# Patient Record
Sex: FEMALE | Race: BLACK OR AFRICAN AMERICAN | Employment: FULL TIME | URBAN - METROPOLITAN AREA
[De-identification: names, ages, dates, MRNs, and addresses within clinical notes are randomized per-mention and may not be internally consistent; named-entity substitution may affect disease eponyms.]

---

## 2017-08-14 ENCOUNTER — OFFICE VISIT (OUTPATIENT)
Dept: INTERNAL MEDICINE CLINIC | Age: 27
End: 2017-08-14

## 2017-08-14 VITALS
OXYGEN SATURATION: 97 % | HEART RATE: 62 BPM | DIASTOLIC BLOOD PRESSURE: 70 MMHG | HEIGHT: 63 IN | BODY MASS INDEX: 21.19 KG/M2 | RESPIRATION RATE: 16 BRPM | WEIGHT: 119.6 LBS | SYSTOLIC BLOOD PRESSURE: 108 MMHG | TEMPERATURE: 98.2 F

## 2017-08-14 DIAGNOSIS — Z23 ENCOUNTER FOR IMMUNIZATION: ICD-10-CM

## 2017-08-14 DIAGNOSIS — G35 MS (MULTIPLE SCLEROSIS) (HCC): ICD-10-CM

## 2017-08-14 DIAGNOSIS — Z00.00 PHYSICAL EXAM, ANNUAL: Primary | ICD-10-CM

## 2017-08-14 RX ORDER — GLATIRAMER 40 MG/ML
40 INJECTION, SOLUTION SUBCUTANEOUS
COMMUNITY

## 2017-08-14 NOTE — PROGRESS NOTES
HISTORY OF PRESENT ILLNESS  Gaston Meek is a 32 y.o. female. HPI   Pt is here to establish care. BP today is great-108/70    Pt follows with Dr. Farhad Webber (neuro) at [facility] for her MS   She was dx'd 7/2014 with multiple sclerosis  Continues copaxone per this physician  She sees him routinely, will get notes for review  She states Dr. Farhad Webber would stop copaxone if she were to become pregnant     Reviewed labs 2012 per [facility]  Lupus test-nl, sed rate nl, RICKY positive, kidney nl, liver nl, blood counts nl  Ordered basic labs to complete today    Wt is 119 lbs-normal ranges  She is not exercising routinely  Discussed diet with her     Pt follows at [facility] (gyn)  She sees various providers here  Last here around 4/17, will get notes  She is not actively trying to pregnant at this time but is open to this  She wonders about issues with infertility and testing with this-addressed  Pt had been trying for about 6 months but had trouble with this and did not ultimately get pregnant  Discussed actively trying for about 6 months and then seeing gyn for further evaluation  Advised starting prenatal vitamin at this time and avoiding alcohol while trying to conceive      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   Discussed hep B series- will check titres for this and varicella  Discussed completing tdap today and flu shot this fall   Advised obtaining her immunizations records      PMHx:  Multiple sclerosis       FMHx:  Maternal side- diabetes  Grandfather- lung cancer  Grandmother- HTN, diabetes      PSHx:  None     SHx:  Never smoker  Rare alcohol  Not   No children   Going to nursing school         PREVENTIVE:  Colonoscopy, mammogram, DEXA: not yet needed  Pap: [facility], 4/17  Tdap: given today, 8/14/2017  Flu shot: will get this year for nursing school   Eye exam: [facility], 1/17  Lipids: 8/17 ordered      There are no active problems to display for this patient.     Current Outpatient Prescriptions   Medication Sig Dispense Refill    glatiramer (COPAXONE) 40 mg/mL injection 40 mg by SubCUTAneous route daily.  Norethindrn A-E Estradiol-Iron (LOESTRIN 24 FE) 1-20 (24)-75(4) mg-mcg-mg tablet Take  by mouth daily.  methylPREDNISolone (MEDROL DOSEPACK) 4 mg tablet Per dose pack instructions 1 Package 0    promethazine (PHENERGAN) 25 mg tablet Take 1 Tab by mouth every six (6) hours as needed for Nausea. 12 Tab 0    cephALEXin (KEFLEX) 500 mg capsule Take 1 Cap by mouth three (3) times daily. 15 Cap 0    MECLIZINE HCL (ANTIVERT PO) Take  by mouth. History reviewed. No pertinent surgical history. Lab Results  Component Value Date/Time   WBC 9.1 03/02/2012 06:50 PM   HGB 13.2 03/02/2012 06:50 PM   HCT 38.3 03/02/2012 06:50 PM   PLATELET 379 27/83/9607 06:50 PM   MCV 96.5 03/02/2012 06:50 PM     No results found for: CHOL, CHOLPOCT, HDL, LDL, LDLC, LDLCPOC, LDLCEXT, TRIGL, TGLPOCT, CHHD, CHHDX  Lab Results  Component Value Date/Time   GFR est non-AA >60 03/02/2012 06:50 PM   GFR est AA >60 03/02/2012 06:50 PM   Creatinine 0.9 03/02/2012 06:50 PM   BUN 15 03/02/2012 06:50 PM   Sodium 138 03/02/2012 06:50 PM   Potassium 4.3 03/02/2012 06:50 PM   Chloride 102 03/02/2012 06:50 PM   CO2 29 03/02/2012 06:50 PM          Review of Systems   Constitutional: Negative for chills and fever. HENT: Negative for hearing loss and tinnitus. Eyes: Negative for blurred vision and double vision. Respiratory: Negative for shortness of breath and wheezing. Cardiovascular: Negative for chest pain and palpitations. Gastrointestinal: Negative for nausea and vomiting. Genitourinary: Negative for dysuria and frequency. Musculoskeletal: Negative for back pain and falls. Skin: Negative for itching and rash. Neurological: Negative for dizziness, loss of consciousness and headaches. Psychiatric/Behavioral: Negative for depression. The patient is not nervous/anxious.         Physical Exam Constitutional: She is oriented to person, place, and time. She appears well-developed and well-nourished. No distress. HENT:   Head: Normocephalic and atraumatic. Right Ear: External ear normal.   Left Ear: External ear normal.   Mouth/Throat: Oropharynx is clear and moist. No oropharyngeal exudate. Eyes: Conjunctivae and EOM are normal. Pupils are equal, round, and reactive to light. Right eye exhibits no discharge. Left eye exhibits no discharge. No scleral icterus. Neck: Normal range of motion. Neck supple. No carotid bruits     Cardiovascular: Normal rate, regular rhythm, normal heart sounds and intact distal pulses. Exam reveals no gallop and no friction rub. No murmur heard. Pulmonary/Chest: Effort normal and breath sounds normal. No respiratory distress. She has no wheezes. She has no rales. She exhibits no tenderness. Abdominal: Soft. She exhibits no distension and no mass. There is no tenderness. There is no rebound and no guarding. Musculoskeletal: Normal range of motion. She exhibits no edema, tenderness or deformity. Lymphadenopathy:     She has no cervical adenopathy. Neurological: She is alert and oriented to person, place, and time. Coordination normal.   Skin: Skin is warm and dry. No rash noted. She is not diaphoretic. No erythema. No pallor. Psychiatric: She has a normal mood and affect. Her behavior is normal.       ASSESSMENT and PLAN    ICD-10-CM ICD-9-CM    1. Physical exam, annual    Normal wt, discussed diet and exercise. Due for labs, ordered. Eye exam UTD. Pelvic exam UTD. Flu shot in the fall.  tdap given today T91.12 W73.8 METABOLIC PANEL, COMPREHENSIVE      LIPID PANEL      CBC W/O DIFF      TSH 3RD GENERATION      VZV AB, IGG      HEP B SURFACE AB      MEASLES/MUMPS/RUBELLA IMMUNITY   2. MS (multiple sclerosis) ([facility] [address]    Follows with Dr. Hulon Mortimer, stable on copaxone G35 340           Depression screen reviewed and negative      Written by Michele Tomlinson, [address] [place] [address], as dictated by Marge Galvan MD.    Current diagnosis and concerns discussed with pt at length. Understands risks and benefits or current treatment plan and medications and accepts the treatment and medication with any possible risks.   Pt asks appropriate questions which were answered.   Pt instructed to call with any concerns or problems. This note will not be viewable in [address]

## 2017-08-14 NOTE — PROGRESS NOTES
VORB per Dr. Vida West, TDAP vaccine given into L deltoid muscle at 0.5ml after obtaining consent from the pt. Administered by Chucho Corado LPN. VIS given. 8/14/17.

## 2017-08-14 NOTE — MR AVS SNAPSHOT
Visit Information Date & Time Provider Department Dept. Phone Encounter #  
 8/14/2017  9:30 AM Jerrica Lizarraga, [facility] [phone] [id number] Follow-up Instructions Return in about 1 year (around 8/14/2018). Upcoming Health Maintenance Date Due DTaP/Tdap/Td series (1 - Tdap) 4/15/2011 INFLUENZA AGE 9 TO ADULT 8/1/2017 PAP AKA CERVICAL CYTOLOGY 4/10/2020 Allergies as of 8/14/2017  Review Complete On: 8/14/2017 By: Jerrica Lizarraga MD  
 No Known Allergies Current Immunizations  Reviewed on 8/14/2017 No immunizations on file. Reviewed by Jerrica Lizarraga MD on 8/14/2017 at  9:57 AM  
You Were Diagnosed With   
  
 Codes Comments Physical exam, annual    -  Primary ICD-10-CM: Z00.00 ICD-9-CM: V70.0 MS (multiple sclerosis) ([facility]     ICD-10-CM: G35 
ICD-9-CM: 487 Vitals BP Pulse Temp Resp Height(growth percentile) Weight(growth percentile) 108/70 (BP 1 Location: Left arm, BP Patient Position: Sitting) 62 98.2 °F (36.8 °C) (Oral) 16 5' 2.5\" (1.588 m) 119 lb 9.6 oz (54.3 kg) SpO2 BMI Smoking Status 97% 21.53 kg/m2 Never Smoker Vitals History BMI and BSA Data Body Mass Index Body Surface Area  
 21.53 kg/m 2 1.55 m 2 Preferred Pharmacy Pharmacy Name Phone [facility]/PHARMACY #[id number] AT [address] [place] [address] [phone] Your Updated Medication List  
  
   
This list is accurate as of: 8/14/17 10:17 AM.  Always use your most recent med list.  
  
  
  
  
 COPAXONE 40 mg/mL injection Generic drug:  glatiramer 40 mg by SubCUTAneous route daily. We Performed the Following CBC W/O DIFF [15294 CPT(R)] HEP B SURFACE AB [id number] CPT(R)] LIPID PANEL [54940 CPT(R)] MEASLES/MUMPS/RUBELLA IMMUNITY [id number] CPT(R)] METABOLIC PANEL, COMPREHENSIVE [56478 CPT(R)] TSH 3RD GENERATION [90405 CPT(R)] VZV AB, IGG [id number] CPT(R)] Follow-up Instructions Return in about 1 year (around 8/14/2018). Introducing [facility] & HEALTH SERVICES! Rob Espino introduces [employer] patient portal. Now you can access parts of your medical record, email your doctor's office, and request medication refills online. 1. In your internet browser, go to [email or web address] 2. Click on the First Time User? Click Here link in the Sign In box. You will see the New Member Sign Up page. 3. Enter your Ag[employer] Access Code exactly as it appears below. You will not need to use this code after youve completed the sign-up process. If you do not sign up before the expiration date, you must request a new code. · [employer] Access Code: [email or web address] Expires: 11/12/2017 10:08 AM 
 
4. Enter the last four digits of your Social Security Number (xxxx) and Date of Birth (mm/dd/yyyy) as indicated and click Submit. You will be taken to the next sign-up page. 5. Create a Agavideo ID. This will be your [employer] login ID and cannot be changed, so think of one that is secure and easy to remember. 6. Create a Agav[employer] password. You can change your password at any time. 7. Enter your Password Reset Question and Answer. This can be used at a later time if you forget your password. 8. Enter your e-mail address. You will receive e-mail notification when new information is available in [address] 9. Click Sign Up. You can now view and download portions of your medical record. 10. Click the Download Summary menu link to download a portable copy of your medical information. If you have questions, please visit the Frequently Asked Questions section of the [employer] website. Remember, [employer] is NOT to be used for urgent needs. For medical emergencies, dial 911. Now available from your iPhone and Android! Please provide this summary of care documentation to your next provider. Your primary care clinician is listed as Xiomy Sampson. If you have any questions after today's visit, please call [phone].

## 2017-08-15 LAB
ALBUMIN SERPL-MCNC: 4.9 G/DL (ref 3.5–5.5)
ALBUMIN/GLOB SERPL: 1.6 {RATIO} (ref 1.2–2.2)
ALP SERPL-CCNC: 53 IU/L (ref 39–117)
ALT SERPL-CCNC: 7 IU/L (ref 0–32)
AST SERPL-CCNC: 14 IU/L (ref 0–40)
BILIRUB SERPL-MCNC: 0.3 MG/DL (ref 0–1.2)
BUN SERPL-MCNC: 14 MG/DL (ref 6–20)
BUN/CREAT SERPL: 14 (ref 9–23)
CALCIUM SERPL-MCNC: 9.3 MG/DL (ref 8.7–10.2)
CHLORIDE SERPL-SCNC: 102 MMOL/L (ref 96–106)
CHOLEST SERPL-MCNC: 159 MG/DL (ref 100–199)
CO2 SERPL-SCNC: 21 MMOL/L (ref 18–29)
CREAT SERPL-MCNC: 0.99 MG/DL (ref 0.57–1)
ERYTHROCYTE [DISTWIDTH] IN BLOOD BY AUTOMATED COUNT: 15.7 % (ref 12.3–15.4)
GLOBULIN SER CALC-MCNC: 3 G/DL (ref 1.5–4.5)
GLUCOSE SERPL-MCNC: 76 MG/DL (ref 65–99)
HBV SURFACE AB SER QL: REACTIVE
HCT VFR BLD AUTO: 27.4 % (ref 34–46.6)
HDLC SERPL-MCNC: 57 MG/DL
HGB BLD-MCNC: 8.3 G/DL (ref 11.1–15.9)
LDLC SERPL CALC-MCNC: 91 MG/DL (ref 0–99)
MCH RBC QN AUTO: 23.8 PG (ref 26.6–33)
MCHC RBC AUTO-ENTMCNC: 30.3 G/DL (ref 31.5–35.7)
MCV RBC AUTO: 79 FL (ref 79–97)
MEV IGG SER IA-ACNC: >300 AU/ML
MUV IGG SER IA-ACNC: 245 AU/ML
PLATELET # BLD AUTO: 344 X10E3/UL (ref 150–379)
POTASSIUM SERPL-SCNC: 4.7 MMOL/L (ref 3.5–5.2)
PROT SERPL-MCNC: 7.9 G/DL (ref 6–8.5)
RBC # BLD AUTO: 3.49 X10E6/UL (ref 3.77–5.28)
RUBV IGG SERPL IA-ACNC: 7.42 INDEX
SODIUM SERPL-SCNC: 138 MMOL/L (ref 134–144)
TRIGL SERPL-MCNC: 55 MG/DL (ref 0–149)
TSH SERPL DL<=0.005 MIU/L-ACNC: 1.13 UIU/ML (ref 0.45–4.5)
VLDLC SERPL CALC-MCNC: 11 MG/DL (ref 5–40)
VZV IGG SER IA-ACNC: >4000 INDEX
WBC # BLD AUTO: 6 X10E3/UL (ref 3.4–10.8)

## 2017-08-15 NOTE — PROGRESS NOTES
Let her know labs normal and titers available for school paperwork, once we get flu shot in have her get flu shot and we can sign papers    She needs to bring in records of polio vaccination     On labs has new anemia, does she have heavy menstrual cycles, repeat cbc, ferritin, iron in 2 weeks for further eval, start daily iron 325mg, may need GI w/u if no heavy periods.

## 2017-08-16 DIAGNOSIS — D50.9 IRON DEFICIENCY ANEMIA, UNSPECIFIED IRON DEFICIENCY ANEMIA TYPE: Primary | ICD-10-CM

## 2017-08-16 NOTE — PROGRESS NOTES
Called and spoke with patient after verifying name and . Notified patient of lab results and recommendations from provider. Patient was given a chance to ask questions and verbalized an understanding of the information. Labs ordered, patient states she will come in in 2 weeks for repeat labs. Patient states she does have heavy menstrual cycles.

## 2017-09-01 ENCOUNTER — CLINICAL SUPPORT (OUTPATIENT)
Dept: INTERNAL MEDICINE CLINIC | Age: 27
End: 2017-09-01

## 2017-09-01 DIAGNOSIS — Z23 ENCOUNTER FOR IMMUNIZATION: Primary | ICD-10-CM

## 2017-09-01 NOTE — PROGRESS NOTES
JOEL per Dr. Daily Hopkins, flu vaccine given after obtaining the consent form. 0.5ml injected in the L deltoid muscle intramuscularly. Administered by Janet Suh LPN. VIS given. 9/1/17. Pt informed to come back 9/5/17 to get completed school immunization form. Pt verbalized understanding of information discussed w/ no further questions at this time.

## 2018-08-14 ENCOUNTER — OFFICE VISIT (OUTPATIENT)
Dept: INTERNAL MEDICINE CLINIC | Age: 28
End: 2018-08-14

## 2018-08-14 VITALS
DIASTOLIC BLOOD PRESSURE: 66 MMHG | RESPIRATION RATE: 16 BRPM | OXYGEN SATURATION: 98 % | TEMPERATURE: 98.1 F | HEART RATE: 86 BPM | SYSTOLIC BLOOD PRESSURE: 103 MMHG | HEIGHT: 63 IN | BODY MASS INDEX: 21.97 KG/M2 | WEIGHT: 124 LBS

## 2018-08-14 DIAGNOSIS — D64.9 ANEMIA, UNSPECIFIED TYPE: ICD-10-CM

## 2018-08-14 DIAGNOSIS — Z00.00 PHYSICAL EXAM, ANNUAL: Primary | ICD-10-CM

## 2018-08-14 NOTE — PROGRESS NOTES
HISTORY OF PRESENT ILLNESS  Maria Luisa Martínez is a 29 y.o. female. HPI  Last here 8/14/17. Pt is here for routine care.     BP today is 103/66     Pt follows with Dr. Elicia Zamora (neuro) at [facility] for her [address]   She was dx'd 7/2014 with multiple sclerosis  Continues copaxone per this physician  Reviewed notes 5/18: blurry vision, continue copaxone, nl BP     Reviewed labs 8/17  Will get labs today     Wt is up 5 lbs x lov  Her wt is within nl ranges    On last labs, pt was anemic  Pt has h/o heavy periods  Pt has tried to take her iron regularly but states that it upsets her stomach     Pt follows at [address] [place] [address] (gyn)  She sees various providers here  Last here around 4/17, will get notes  She is not actively trying to pregnant at this time but is open to this    Pt presented with form to fill out for school     PREVENTIVE:  Colonoscopy, mammogram, DEXA: not yet needed  Pap: [address] [place] [address], 4/18  Tdap: 8/14/2017  Flu shot: Fall 2017  Eye exam: [facility], 1/17   Lipids: 8/17 91    Patient Active Problem List    Diagnosis Date Noted    MS (multiple sclerosis) ([facility] [address] 08/14/2017     Current Outpatient Prescriptions   Medication Sig Dispense Refill    glatiramer (COPAXONE) 40 mg/mL injection 40 mg by SubCUTAneous route every Monday, Wednesday, Friday. History reviewed. No pertinent surgical history.    Lab Results  Component Value Date/Time   WBC 6.0 08/14/2017 10:24 AM   HGB 8.3 (L) 08/14/2017 10:24 AM   HCT 27.4 (L) 08/14/2017 10:24 AM   PLATELET 025 79/62/9073 10:24 AM   MCV 79 08/14/2017 10:24 AM     Lab Results  Component Value Date/Time   Cholesterol, total 159 08/14/2017 10:24 AM   HDL Cholesterol 57 08/14/2017 10:24 AM   LDL, calculated 91 08/14/2017 10:24 AM   Triglyceride 55 08/14/2017 10:24 AM     Lab Results  Component Value Date/Time   GFR est non-AA 78 08/14/2017 10:24 AM   GFR est AA 90 08/14/2017 10:24 AM   Creatinine 0.99 08/14/2017 10:24 AM   BUN 14 08/14/2017 10:24 AM   Sodium 138 08/14/2017 10:24 AM   Potassium 4.7 08/14/2017 10:24 AM   Chloride 102 08/14/2017 10:24 AM   CO2 21 08/14/2017 10:24 AM        Review of Systems   Respiratory: Negative for shortness of breath. Cardiovascular: Negative for chest pain. Physical Exam   Constitutional: She is oriented to person, place, and time. She appears well-developed and well-nourished. No distress. HENT:   Head: Normocephalic and atraumatic. Right Ear: External ear normal.   Left Ear: External ear normal.   Mouth/Throat: Oropharynx is clear and moist. No oropharyngeal exudate. Eyes: Conjunctivae and EOM are normal. Right eye exhibits no discharge. Left eye exhibits no discharge. Neck: Normal range of motion. Neck supple. No carotid bruits   Cardiovascular: Normal rate, regular rhythm, normal heart sounds and intact distal pulses. Exam reveals no gallop and no friction rub. No murmur heard. Pulmonary/Chest: Effort normal and breath sounds normal. No respiratory distress. She has no wheezes. She has no rales. She exhibits no tenderness. Abdominal: Soft. She exhibits no distension and no mass. There is no tenderness. There is no rebound and no guarding. Musculoskeletal: Normal range of motion. She exhibits no edema, tenderness or deformity. Lymphadenopathy:     She has no cervical adenopathy. Neurological: She is alert and oriented to person, place, and time. Coordination normal.   Skin: Skin is warm and dry. No rash noted. She is not diaphoretic. No erythema. No pallor. Psychiatric: She has a normal mood and affect. Her behavior is normal.       ASSESSMENT and PLAN    ICD-10-CM ICD-9-CM    1. Physical exam, annual    Nl wt, nl BP, labs ordered, flu shot in the fall, pelvic UTD, eye exam this winter, completed paperwork for her   Z00.00 V70.0 LIPID PANEL      TSH 3RD GENERATION      HEMOGLOBIN A1C WITH EAG      CBC W/O DIFF      METABOLIC PANEL, COMPREHENSIVE   2.  Anemia, unspecified type    Pt with sig anemia on recent labs, not taking iron consistently, she did not complete lab workup, ordered this today, she does have heavy menstrual cycles which are possible cause, denies fmhx of thalassemia or sickle cell   D64.9 285.9 FERRITIN      IRON PROFILE      VITAMIN B12      CANCELED: ALPHA THALASSEMIA      Depression screen reviewed and negative. Scribed by Aaron Monson of [address] [place] [address] [address], as dictated by Dr. Andrei Vieira. Current diagnosis and concerns discussed with pt at length. Pt understands risks and benefits or current treatment plan and medications, and accepts the treatment and medication with any possible risks. Pt asks appropriate questions, which were answered. Pt was instructed to call with any concerns or problems. This note will not be viewable in [address]

## 2018-08-14 NOTE — MR AVS SNAPSHOT
[address] [place] [phone] Patient: Kurt Figueroa MRN: [id number] KQV:[id number] Visit Information Date & Time Provider Department Dept. Phone Encounter #  
 8/14/2018  9:15 AM Mario Jay, [address] [place] [address],[address] [phone] [id number] Follow-up Instructions Return in about 1 year (around 8/14/2019). Upcoming Health Maintenance Date Due Influenza Age 5 to Adult 8/1/2018 PAP AKA CERVICAL CYTOLOGY 4/10/2020 DTaP/Tdap/Td series (2 - Td) 8/14/2027 Allergies as of 8/14/2018  Review Complete On: 8/14/2018 By: Pipo Epperson LPN No Known Allergies Current Immunizations  Reviewed on 8/14/2017 Name Date Influenza Vaccine (Quad) PF 9/1/2017 Tdap 8/14/2017 Not reviewed this visit You Were Diagnosed With   
  
 Codes Comments Physical exam, annual    -  Primary ICD-10-CM: Z00.00 ICD-9-CM: V70.0 Anemia, unspecified type     ICD-10-CM: D64.9 ICD-9-CM: 278. 9 Vitals BP Pulse Temp Resp Height(growth percentile) Weight(growth percentile) 103/66 (BP 1 Location: Left arm, BP Patient Position: Sitting) 86 98.1 °F (36.7 °C) (Oral) 16 5' 2.5\" (1.588 m) 124 lb (56.2 kg) SpO2 BMI OB Status Smoking Status 98% 22.32 kg/m2 Having regular periods Never Smoker Vitals History BMI and BSA Data Body Mass Index Body Surface Area  
 22.32 kg/m 2 1.57 m 2 Preferred Pharmacy Pharmacy Name Phone [facility]/PH[facility]ACY [id number] [address] [facility] AT [address] [place] [address] [phone] Your Updated Medication List  
  
   
This list is accurate as of 8/14/18  9:39 AM.  Always use your most recent med list.  
  
  
  
  
 COPAXONE 40 mg/mL injection Generic drug:  glatiramer 40 mg by SubCUTAneous route every Monday, Wednesday, Friday. We Performed the Following CBC W/O DIFF [97038 CPT(R)] FERRITIN [53474 CPT(R)] HEMOGLOBIN A1C WITH EAG [42337 CPT(R)] IRON PROFILE Q809883 CPT(R)] LIPID PANEL [08109 CPT(R)] METABOLIC PANEL, COMPREHENSIVE [20580 CPT(R)] TSH 3RD GENERATION [02341 CPT(R)] VITAMIN B12 [id number] CPT(R)] Follow-up Instructions Return in about 1 year (around 8/14/2019). Introducing [facility] & HEALTH SERVICES! Kassi Head introduces [employer] patient portal. Now you can access parts of your medical record, email your doctor's office, and request medication refills online. 1. In your internet browser, go to [email or web address] 2. Click on the First Time User? Click Here link in the Sign In box. You will see the New Member Sign Up page. 3. Enter your [employer] Access Code exactly as it appears below. You will not need to use this code after youve completed the sign-up process. If you do not sign up before the expiration date, you must request a new code. · [employer] Access Code: [email or web address] Expires: 11/12/2018  9:39 AM 
 
4. Enter the last four digits of your Social Security Number (xxxx) and Date of Birth (mm/dd/yyyy) as indicated and click Submit. You will be taken to the next sign-up page. 5. Create a [employer]eva ID. This will be your [employer] login ID and cannot be changed, so think of one that is secure and easy to remember. 6. Create a [employer] password. You can change your password at any time. 7. Enter your Password Reset Question and Answer. This can be used at a later time if you forget your password. 8. Enter your e-mail address. You will receive e-mail notification when new information is available in [address] 9. Click Sign Up. You can now view and download portions of your medical record. 10. Click the Download Summary menu link to download a portable copy of your medical information.  
 
If you have questions, please visit the Frequently Asked Questions section of the [employer]. Remember, [employer] is NOT to be used for urgent needs. For medical emergencies, dial 911. Now available from your iPhone and Android! Please provide this summary of care documentation to your next provider. Your primary care clinician is listed as Edgar Line. If you have any questions after today's visit, please call [phone].

## 2018-08-15 LAB
ALBUMIN SERPL-MCNC: 4.1 G/DL (ref 3.5–5.5)
ALBUMIN/GLOB SERPL: 1.3 {RATIO} (ref 1.2–2.2)
ALP SERPL-CCNC: 49 IU/L (ref 39–117)
ALT SERPL-CCNC: 8 IU/L (ref 0–32)
AST SERPL-CCNC: 14 IU/L (ref 0–40)
BILIRUB SERPL-MCNC: <0.2 MG/DL (ref 0–1.2)
BUN SERPL-MCNC: 12 MG/DL (ref 6–20)
BUN/CREAT SERPL: 16 (ref 9–23)
CALCIUM SERPL-MCNC: 9.1 MG/DL (ref 8.7–10.2)
CHLORIDE SERPL-SCNC: 103 MMOL/L (ref 96–106)
CHOLEST SERPL-MCNC: 146 MG/DL (ref 100–199)
CO2 SERPL-SCNC: 22 MMOL/L (ref 20–29)
CREAT SERPL-MCNC: 0.76 MG/DL (ref 0.57–1)
ERYTHROCYTE [DISTWIDTH] IN BLOOD BY AUTOMATED COUNT: 15 % (ref 12.3–15.4)
EST. AVERAGE GLUCOSE BLD GHB EST-MCNC: 108 MG/DL
FERRITIN SERPL-MCNC: 4 NG/ML (ref 15–150)
GLOBULIN SER CALC-MCNC: 3.2 G/DL (ref 1.5–4.5)
GLUCOSE SERPL-MCNC: 73 MG/DL (ref 65–99)
HBA1C MFR BLD: 5.4 % (ref 4.8–5.6)
HCT VFR BLD AUTO: 28.7 % (ref 34–46.6)
HDLC SERPL-MCNC: 49 MG/DL
HGB BLD-MCNC: 8.5 G/DL (ref 11.1–15.9)
IRON SATN MFR SERPL: 5 % (ref 15–55)
IRON SERPL-MCNC: 17 UG/DL (ref 27–159)
LDLC SERPL CALC-MCNC: 84 MG/DL (ref 0–99)
MCH RBC QN AUTO: 23.3 PG (ref 26.6–33)
MCHC RBC AUTO-ENTMCNC: 29.6 G/DL (ref 31.5–35.7)
MCV RBC AUTO: 79 FL (ref 79–97)
PLATELET # BLD AUTO: 330 X10E3/UL (ref 150–379)
POTASSIUM SERPL-SCNC: 4.5 MMOL/L (ref 3.5–5.2)
PROT SERPL-MCNC: 7.3 G/DL (ref 6–8.5)
RBC # BLD AUTO: 3.65 X10E6/UL (ref 3.77–5.28)
SODIUM SERPL-SCNC: 137 MMOL/L (ref 134–144)
TIBC SERPL-MCNC: 363 UG/DL (ref 250–450)
TRIGL SERPL-MCNC: 66 MG/DL (ref 0–149)
TSH SERPL DL<=0.005 MIU/L-ACNC: 1.17 UIU/ML (ref 0.45–4.5)
UIBC SERPL-MCNC: 346 UG/DL (ref 131–425)
VIT B12 SERPL-MCNC: 461 PG/ML (ref 232–1245)
VLDLC SERPL CALC-MCNC: 13 MG/DL (ref 5–40)
WBC # BLD AUTO: 8.8 X10E3/UL (ref 3.4–10.8)

## 2018-08-15 NOTE — PROGRESS NOTES
Called, spoke to pt. Two pt identifiers confirmed. Pt informed per Dr. Trice Nath persistent anemia, not improved, pt not tolerating po iron. Pt referred to Dr. Rubin Bean for further eval may benefit from iron infusion--make sure she schedules appointment. Pt informed per Dr. Carnes Ran pt reported heavy periods--likely cause of her anemia, needs to discuss treatment with her gyn. Pt informed per Dr. Trice Nath ultimately, will likely need GI w/u as well with egd /colo--start with hematology. Referral mailed to pt.

## 2018-08-15 NOTE — PROGRESS NOTES
Persistent anemia, not improved, pt not tolerating po iron     Send her to hematology lisy for further eval may benefit from iron infusion--make sure she schedules appointment    Pt reports heavy periods--likely cause of her anemia, needs to discuss treatment with her gyn    Ultimately will likely need GI w/u as well with egd /colo--start with hematology

## 2018-09-06 ENCOUNTER — OFFICE VISIT (OUTPATIENT)
Dept: ONCOLOGY | Age: 28
End: 2018-09-06

## 2018-09-06 VITALS
OXYGEN SATURATION: 100 % | HEART RATE: 65 BPM | TEMPERATURE: 98.6 F | DIASTOLIC BLOOD PRESSURE: 70 MMHG | SYSTOLIC BLOOD PRESSURE: 118 MMHG | RESPIRATION RATE: 16 BRPM | BODY MASS INDEX: 21.32 KG/M2 | HEIGHT: 63 IN | WEIGHT: 120.3 LBS

## 2018-09-06 DIAGNOSIS — D50.0 IRON DEFICIENCY ANEMIA DUE TO CHRONIC BLOOD LOSS: Primary | ICD-10-CM

## 2018-09-06 RX ORDER — ALBUTEROL SULFATE 0.83 MG/ML
2.5 SOLUTION RESPIRATORY (INHALATION) AS NEEDED
Status: CANCELLED
Start: 2018-09-20

## 2018-09-06 RX ORDER — ONDANSETRON 2 MG/ML
8 INJECTION INTRAMUSCULAR; INTRAVENOUS AS NEEDED
Status: CANCELLED | OUTPATIENT
Start: 2018-09-20

## 2018-09-06 RX ORDER — SODIUM CHLORIDE 0.9 % (FLUSH) 0.9 %
10 SYRINGE (ML) INJECTION AS NEEDED
Status: CANCELLED
Start: 2018-09-27

## 2018-09-06 RX ORDER — ACETAMINOPHEN 325 MG/1
650 TABLET ORAL AS NEEDED
Status: CANCELLED
Start: 2018-09-20

## 2018-09-06 RX ORDER — HEPARIN 100 UNIT/ML
300-500 SYRINGE INTRAVENOUS AS NEEDED
Status: CANCELLED
Start: 2018-09-20

## 2018-09-06 RX ORDER — DIPHENHYDRAMINE HYDROCHLORIDE 50 MG/ML
50 INJECTION, SOLUTION INTRAMUSCULAR; INTRAVENOUS AS NEEDED
Status: CANCELLED
Start: 2018-09-27

## 2018-09-06 RX ORDER — HEPARIN 100 UNIT/ML
300-500 SYRINGE INTRAVENOUS AS NEEDED
Status: CANCELLED
Start: 2018-09-27

## 2018-09-06 RX ORDER — HYDROCORTISONE SODIUM SUCCINATE 100 MG/2ML
100 INJECTION, POWDER, FOR SOLUTION INTRAMUSCULAR; INTRAVENOUS AS NEEDED
Status: CANCELLED | OUTPATIENT
Start: 2018-09-27

## 2018-09-06 RX ORDER — HYDROCORTISONE SODIUM SUCCINATE 100 MG/2ML
100 INJECTION, POWDER, FOR SOLUTION INTRAMUSCULAR; INTRAVENOUS AS NEEDED
Status: CANCELLED | OUTPATIENT
Start: 2018-09-20

## 2018-09-06 RX ORDER — DIPHENHYDRAMINE HYDROCHLORIDE 50 MG/ML
50 INJECTION, SOLUTION INTRAMUSCULAR; INTRAVENOUS AS NEEDED
Status: CANCELLED
Start: 2018-09-20

## 2018-09-06 RX ORDER — SODIUM CHLORIDE 9 MG/ML
10 INJECTION INTRAMUSCULAR; INTRAVENOUS; SUBCUTANEOUS AS NEEDED
Status: CANCELLED | OUTPATIENT
Start: 2018-09-20

## 2018-09-06 RX ORDER — ACETAMINOPHEN 325 MG/1
650 TABLET ORAL AS NEEDED
Status: CANCELLED
Start: 2018-09-27

## 2018-09-06 RX ORDER — SODIUM CHLORIDE 9 MG/ML
10 INJECTION INTRAMUSCULAR; INTRAVENOUS; SUBCUTANEOUS AS NEEDED
Status: CANCELLED | OUTPATIENT
Start: 2018-09-27

## 2018-09-06 RX ORDER — EPINEPHRINE 1 MG/ML
0.3 INJECTION, SOLUTION, CONCENTRATE INTRAVENOUS AS NEEDED
Status: CANCELLED | OUTPATIENT
Start: 2018-09-20

## 2018-09-06 RX ORDER — EPINEPHRINE 1 MG/ML
0.3 INJECTION, SOLUTION, CONCENTRATE INTRAVENOUS AS NEEDED
Status: CANCELLED | OUTPATIENT
Start: 2018-09-27

## 2018-09-06 RX ORDER — SODIUM CHLORIDE 0.9 % (FLUSH) 0.9 %
10 SYRINGE (ML) INJECTION AS NEEDED
Status: CANCELLED
Start: 2018-09-20

## 2018-09-06 RX ORDER — ONDANSETRON 2 MG/ML
8 INJECTION INTRAMUSCULAR; INTRAVENOUS AS NEEDED
Status: CANCELLED | OUTPATIENT
Start: 2018-09-27

## 2018-09-06 RX ORDER — ALBUTEROL SULFATE 0.83 MG/ML
2.5 SOLUTION RESPIRATORY (INHALATION) AS NEEDED
Status: CANCELLED
Start: 2018-09-27

## 2018-09-06 NOTE — PROGRESS NOTES
[facility]  Social Work Navigator Encounter     Patient Name:  Sven Nelson    Medical History:   Dx MX and Iron deficiency     Advance Directives:    Narrative: Pt very pleasant and pt Mom present also. Pt works at [employer] on [address].  SW provided pt with VSS Monitoring card and asked her to call and also explained how the process worked. SW informed her to call back to office if any questions. Barriers to Care:     Plan:   1.  SW to provide support as needed.

## 2018-09-06 NOTE — PROGRESS NOTES
Hematology Consultation Note        Patient: Melinda Carmichael MRN: [id number]  SSN: [id number]    YOB: 1990  Age: 29 y.o. Sex: female      Subjective:      Melinda Carmichael is a 29 y.o. female who I am seeing for iron deficiency anemia. She has been noted to have anemia for several years. She has some fatigue. Recent laboratory studies show low hemoglobin. She works at the [employer] on [employer] [place]. She denies rectal bleeding. She is accompanied by her mother. She does not tolerate oral iron supplements due to nausea, constipation etc. She also carries a diagnosis of Multiple sclerosis and received Copaxone at [facility]. Review of Systems:    Constitutional: fatigue  Eyes: negative  Ears, Nose, Mouth, Throat, and Face: negative  Respiratory: negative  Cardiovascular: negative  Gastrointestinal: negative  Genitourinary:negative  Integument/Breast: negative  Hematologic/Lymphatic: negative  Musculoskeletal:negative  Neurological: negative      Past Medical History:   Diagnosis Date    Multiple sclerosis ([facility] [address] 2014     No past surgical history on file. Family History   Problem Relation Age of Onset    Hypertension Maternal Grandmother     Diabetes Maternal Grandmother     Lung Disease Maternal Grandfather      Social History   Substance Use Topics    Smoking status: Never Smoker    Smokeless tobacco: Never Used    Alcohol use No      Prior to Admission medications    Medication Sig Start Date End Date Taking? Authorizing Provider   glatiramer (COPAXONE) 40 mg/mL injection 40 mg by SubCUTAneous route every Monday, Wednesday, Friday.    Yes Historical Provider              No Known Allergies        Objective:     Vitals:    09/06/18 0912   BP: 118/70   Pulse: 65   Resp: 16   Temp: 98.6 °F (37 °C)   TempSrc: Oral   SpO2: 100%   Weight: 120 lb 4.8 oz (54.6 kg)   Height: 5' 2.5\" (1.588 m)            Physical Exam:    GENERAL: alert, cooperative, no distress, appears stated age  EYE: negative  LYMPHATIC: Cervical, supraclavicular, and axillary nodes normal.   THROAT & NECK: normal and no erythema or exudates noted. LUNG: clear to auscultation bilaterally  HEART: regular rate and rhythm  ABDOMEN: soft, non-tender  EXTREMITIES:  no edema  SKIN: Normal.  NEUROLOGIC: negative      Lab Results   Component Value Date/Time    WBC 8.8 08/14/2018 10:00 AM    HGB 8.5 (L) 08/14/2018 10:00 AM    HCT 28.7 (L) 08/14/2018 10:00 AM    PLATELET 560 51/24/2479 10:00 AM    MCV 79 08/14/2018 10:00 AM       Lab Results   Component Value Date/Time    Iron 17 (L) 08/14/2018 10:00 AM    TIBC 363 08/14/2018 10:00 AM    Iron % saturation 5 (LL) 08/14/2018 10:00 AM    Ferritin 4 (L) 08/14/2018 10:00 AM             Assessment:     1. Iron deficiency anemia secondary to menorrhagia:    I discussed with her various ways to replace/supplement iron which includes giving oral iron preparation such as iron sulfate or similar products or utilizing intravenous access to administer the total dose of iron. The patient was given the option to choose from various oral and intravenous iron preparation and after a prolonged discussion we have agreed to proceed with IV Iron to be administered in OPIC. I counseled the patient regarding the side effects of IV Iron infusion which includes rare instances of anaphylactic reactions etc.  After weighing the benefit and risks, the patient agreed to proceed with the treatment. Plan:       1. IV Iron in [facility]  2. Labs in 3 and 6 months  3. Return in 6 monhs        Signed by: Bossman Bland MD                     September 6, 2018         CC.  Nahum Hogan MD

## 2018-09-06 NOTE — PROGRESS NOTES
Kurt Figueroa is a 29 y.o. female new patient referred by Dr. Mery Pinedo to provider for Anemia. H/O MS. VS stable. Patient denies pain. Patient has heavy menstrual cycles; cycles 5-7 days; cycles heavy with clots for 4-5 days.      Visit Vitals    /70 (BP 1 Location: Left arm, BP Patient Position: Sitting)    Pulse 65    Temp 98.6 °F (37 °C) (Oral)    Resp 16    Ht 5' 2.5\" (1.588 m)    Wt 120 lb 4.8 oz (54.6 kg)    LMP 08/20/2018    SpO2 100%    BMI 21.65 kg/m2

## 2018-09-18 ENCOUNTER — APPOINTMENT (OUTPATIENT)
Dept: INFUSION THERAPY | Age: 28
End: 2018-09-18

## 2018-09-20 ENCOUNTER — HOSPITAL ENCOUNTER (OUTPATIENT)
Dept: INFUSION THERAPY | Age: 28
Discharge: HOME | End: 2018-09-20
Payer: COMMERCIAL

## 2018-09-20 VITALS
OXYGEN SATURATION: 100 % | HEART RATE: 60 BPM | SYSTOLIC BLOOD PRESSURE: 108 MMHG | RESPIRATION RATE: 18 BRPM | DIASTOLIC BLOOD PRESSURE: 68 MMHG | TEMPERATURE: 98.1 F

## 2018-09-20 DIAGNOSIS — D50.0 IRON DEFICIENCY ANEMIA DUE TO CHRONIC BLOOD LOSS: ICD-10-CM

## 2018-09-20 PROCEDURE — 74011250636 HC RX REV CODE- 250/636: Performed by: NURSE PRACTITIONER

## 2018-09-20 PROCEDURE — 74011000258 HC RX REV CODE- 258: Performed by: NURSE PRACTITIONER

## 2018-09-20 PROCEDURE — 96365 THER/PROPH/DIAG IV INF INIT: CPT

## 2018-09-20 PROCEDURE — 74011250636 HC RX REV CODE- 250/636: Performed by: INTERNAL MEDICINE

## 2018-09-20 RX ORDER — SODIUM CHLORIDE 0.9 % (FLUSH) 0.9 %
5-10 SYRINGE (ML) INJECTION AS NEEDED
Status: ACTIVE | OUTPATIENT
Start: 2018-09-20 | End: 2018-09-21

## 2018-09-20 RX ORDER — SODIUM CHLORIDE 9 MG/ML
25 INJECTION, SOLUTION INTRAVENOUS AS NEEDED
Status: DISPENSED | OUTPATIENT
Start: 2018-09-20 | End: 2018-09-21

## 2018-09-20 RX ADMIN — SODIUM CHLORIDE 25 ML/HR: 900 INJECTION, SOLUTION INTRAVENOUS at 09:44

## 2018-09-20 RX ADMIN — FERUMOXYTOL 510 MG: 510 INJECTION INTRAVENOUS at 10:00

## 2018-09-20 RX ADMIN — Medication 10 ML: at 09:45

## 2018-09-20 NOTE — PROGRESS NOTES
Problem: Knowledge Deficit Goal: *Verbalizes understanding of procedures and medications Outcome: Progressing Towards Goal 
Patient is able to verbalize the reason and understanding of today's infusion. Problem: Patient Education:  Go to Education Activity Goal: Patient/Family Education Outcome: Progressing Towards Goal 
Patient educated on the possible signs and symptoms of possible Feraheme infusion reactions.

## 2018-09-20 NOTE — PROGRESS NOTES
OPIC Short Note Labs/Type & Cross/Portflush/Antibiotic/Non-Chemo injections Date: 2018 Name: Luis Alfredo Mahoney MRN: [id number] : 1990 Treatment: [facility] 
 
 
Ms. Hernández was assessed and education was provided. Ms. Fidel Dixon vitals were reviewed prior to treatment. Patient Vitals for the past 12 hrs: 
 Temp Pulse Resp BP SpO2  
18 1050 - 60 18 108/68 -  
18 1018 - 65 - 110/66 -  
18 0926 98.1 °F (36.7 °C) 63 18 102/66 100 %  
 
 
ferumoxytol (FERAHEME) 510 mg in 0.9% sodium chloride 100 mL, overfill volume 10 mL IVPB  was infused over 18 minutes. Ms. Kyle Maldonado tolerated the infusion, and had no complaints. Ms. Kyle Maldonado was discharged from [place] [address] in stable condition at 1050 after 30 min observation period. She is to return on 18 for her next appointment, patient aware. Jarocho Mojica RN 2018 
3:40 PM

## 2018-09-25 ENCOUNTER — APPOINTMENT (OUTPATIENT)
Dept: INFUSION THERAPY | Age: 28
End: 2018-09-25

## 2018-09-27 ENCOUNTER — HOSPITAL ENCOUNTER (OUTPATIENT)
Dept: INFUSION THERAPY | Age: 28
Discharge: HOME | End: 2018-09-27
Payer: COMMERCIAL

## 2018-09-27 VITALS
SYSTOLIC BLOOD PRESSURE: 109 MMHG | HEART RATE: 64 BPM | DIASTOLIC BLOOD PRESSURE: 75 MMHG | RESPIRATION RATE: 18 BRPM | OXYGEN SATURATION: 100 % | TEMPERATURE: 98.1 F

## 2018-09-27 DIAGNOSIS — D50.0 IRON DEFICIENCY ANEMIA DUE TO CHRONIC BLOOD LOSS: ICD-10-CM

## 2018-09-27 PROCEDURE — 96374 THER/PROPH/DIAG INJ IV PUSH: CPT

## 2018-09-27 PROCEDURE — 74011250636 HC RX REV CODE- 250/636: Performed by: NURSE PRACTITIONER

## 2018-09-27 PROCEDURE — 74011000258 HC RX REV CODE- 258: Performed by: NURSE PRACTITIONER

## 2018-09-27 RX ORDER — HEPARIN 100 UNIT/ML
300-500 SYRINGE INTRAVENOUS AS NEEDED
Status: ACTIVE | OUTPATIENT
Start: 2018-09-27 | End: 2018-09-27

## 2018-09-27 RX ORDER — SODIUM CHLORIDE 0.9 % (FLUSH) 0.9 %
10 SYRINGE (ML) INJECTION AS NEEDED
Status: ACTIVE | OUTPATIENT
Start: 2018-09-27 | End: 2018-09-27

## 2018-09-27 RX ORDER — SODIUM CHLORIDE 9 MG/ML
10 INJECTION INTRAMUSCULAR; INTRAVENOUS; SUBCUTANEOUS AS NEEDED
Status: ACTIVE | OUTPATIENT
Start: 2018-09-27 | End: 2018-09-27

## 2018-09-27 RX ADMIN — Medication 10 ML: at 09:12

## 2018-09-27 RX ADMIN — FERUMOXYTOL 510 MG: 510 INJECTION INTRAVENOUS at 09:25

## 2018-09-27 NOTE — PROGRESS NOTES
3977 Pt arrived ambulatory and in no distress for Feraheme. Assessment complete. No new complaints voiced. 24 gauge IV started in right forearm without difficulty, brisk blood return. Patient Vitals for the past 12 hrs: 
 Temp Pulse Resp BP SpO2  
09/27/18 1011 - 64 - 109/75 -  
09/27/18 0945 - 69 - 108/69 -  
09/27/18 0906 98.1 °F (36.7 °C) 66 18 102/78 100 % Medications: 
Feraheme 510 mg IV 
 
1015 Pt observed for 15 mins post infusion, no s/s of reaction. Discharged home ambulatory and in no distress. Pt to follow-up with referring provider.

## 2019-03-07 ENCOUNTER — OFFICE VISIT (OUTPATIENT)
Dept: ONCOLOGY | Age: 29
End: 2019-03-07

## 2019-03-07 VITALS
DIASTOLIC BLOOD PRESSURE: 75 MMHG | RESPIRATION RATE: 16 BRPM | WEIGHT: 122.7 LBS | BODY MASS INDEX: 21.74 KG/M2 | SYSTOLIC BLOOD PRESSURE: 111 MMHG | HEART RATE: 68 BPM | TEMPERATURE: 98.2 F | OXYGEN SATURATION: 98 % | HEIGHT: 63 IN

## 2019-03-07 DIAGNOSIS — D50.0 IRON DEFICIENCY ANEMIA DUE TO CHRONIC BLOOD LOSS: Primary | ICD-10-CM

## 2019-03-07 NOTE — PROGRESS NOTES
[address], [address] [place] [address] Matheus Coleineau, [address] [facility]  [phone]      Hematology progress Note        Patient: Vincent Santos MRN: [id number]  SSN: [id number]    YOB: 1990  Age: 29 y.o. Sex: female      Subjective:      Vincent Santos is a 29 y.o. female who I am seeing for iron deficiency anemia. She has been noted to have anemia for several years. She has some fatigue. Recent laboratory studies show low hemoglobin. She works at [employer] on [address]. She denies rectal bleeding. She is accompanied by her mother. She does not tolerate oral iron supplements due to nausea, constipation etc. She also carries a diagnosis of Multiple sclerosis and received Copaxone at [address] [place] [address]. She received IV iron in September 2018. She says she continues feel fatigued. She is currently in nursing school. Review of Systems:    Constitutional: fatigue  Eyes: negative  Ears, Nose, Mouth, Throat, and Face: negative  Respiratory: negative  Cardiovascular: negative  Gastrointestinal: negative  Genitourinary:negative  Integument/Breast: negative  Hematologic/Lymphatic: negative  Musculoskeletal:negative  Neurological: negative      Past Medical History:   Diagnosis Date    Multiple sclerosis ([facility] [address] 2014     No past surgical history on file. Family History   Problem Relation Age of Onset    Hypertension Maternal Grandmother     Diabetes Maternal Grandmother     Lung Disease Maternal Grandfather      Social History     Tobacco Use    Smoking status: Never Smoker    Smokeless tobacco: Never Used   Substance Use Topics    Alcohol use: No      Prior to Admission medications    Medication Sig Start Date End Date Taking? Authorizing Provider   glatiramer (COPAXONE) 40 mg/mL injection 40 mg by SubCUTAneous route every Monday, Wednesday, Friday.    Yes Provider, Historical          No Known Allergies      Objective: Vitals:    03/07/19 0954   BP: 111/75   Pulse: 68   Resp: 16   Temp: 98.2 °F (36.8 °C)   TempSrc: Oral   SpO2: 98%   Weight: 122 lb 11.2 oz (55.7 kg)   Height: 5' 2.5\" (1.588 m)          Physical Exam:    GENERAL: alert, cooperative, no distress, appears stated age  EYE: negative  LYMPHATIC: Cervical, supraclavicular, and axillary nodes normal.   THROAT & NECK: normal and no erythema or exudates noted. LUNG: clear to auscultation bilaterally  HEART: regular rate and rhythm  ABDOMEN: soft, non-tender  EXTREMITIES:  no edema  SKIN: Normal.  NEUROLOGIC: negative      Lab Results   Component Value Date/Time    WBC 8.8 08/14/2018 10:00 AM    HGB 8.5 (L) 08/14/2018 10:00 AM    HCT 28.7 (L) 08/14/2018 10:00 AM    PLATELET 259 73/74/1548 10:00 AM    MCV 79 08/14/2018 10:00 AM       Lab Results   Component Value Date/Time    Iron 17 (L) 08/14/2018 10:00 AM    TIBC 363 08/14/2018 10:00 AM    Iron % saturation 5 (LL) 08/14/2018 10:00 AM    Ferritin 4 (L) 08/14/2018 10:00 AM       Assessment:     1. Iron deficiency anemia secondary to menorrhagia:    Did not tolerate oral iron supplements  Received IV fereheme 9.2018  No recent labs - will obtain today  Will give IV injectafer in the opic if needed. Plan:       1. IV Iron injectafer in OPIC if needed  2. Labs CBC, iron profile and Ferritin today  3. Return in 6 months      Signed by: Isidoro Juarez MD                     March 7, 2019      CC.  Deborah Silveira MD

## 2019-03-07 NOTE — PROGRESS NOTES
Vic Gao is a 29 y.o. female seing  provider for Anemia f/u. Mamta Rozina H/O MS; pt receiving Copaxone at [address] [facility]. Last IV iron 9/20 & 9/27. Patient denies fatigue. No recent labs. VS stable. Patient denies pain. Patient has heavy menstrual cycles; cycles 5-7 days; cycles heavy with clots for 4-5 days. Visit Vitals  /75 (BP 1 Location: Left arm, BP Patient Position: Sitting)   Pulse 68   Temp 98.2 °F (36.8 °C) (Oral)   Resp 16   Ht 5' 2.5\" (1.588 m)   Wt 122 lb 11.2 oz (55.7 kg)   SpO2 98%   BMI 22.08 kg/m²       Pain Scale: /10  Pain Location:       Health Maintenance Review: Patient reminded of \"due or due soon\" health maintenance. I have asked the patient to contact his/her primary care provider (PCP) for follow-up on his/her health maintenance.

## 2019-03-08 LAB
ERYTHROCYTE [DISTWIDTH] IN BLOOD BY AUTOMATED COUNT: 12.4 % (ref 12.3–15.4)
FERRITIN SERPL-MCNC: 58 NG/ML (ref 15–150)
HCT VFR BLD AUTO: 36.8 % (ref 34–46.6)
HGB BLD-MCNC: 12.5 G/DL (ref 11.1–15.9)
IRON SATN MFR SERPL: 29 % (ref 15–55)
IRON SERPL-MCNC: 82 UG/DL (ref 27–159)
MCH RBC QN AUTO: 32.6 PG (ref 26.6–33)
MCHC RBC AUTO-ENTMCNC: 34 G/DL (ref 31.5–35.7)
MCV RBC AUTO: 96 FL (ref 79–97)
PLATELET # BLD AUTO: 256 X10E3/UL (ref 150–379)
RBC # BLD AUTO: 3.83 X10E6/UL (ref 3.77–5.28)
TIBC SERPL-MCNC: 282 UG/DL (ref 250–450)
UIBC SERPL-MCNC: 200 UG/DL (ref 131–425)
WBC # BLD AUTO: 5.4 X10E3/UL (ref 3.4–10.8)

## 2019-06-12 ENCOUNTER — TELEPHONE (OUTPATIENT)
Dept: ONCOLOGY | Age: 29
End: 2019-06-12

## 2022-03-19 PROBLEM — D50.0 IRON DEFICIENCY ANEMIA DUE TO CHRONIC BLOOD LOSS: Status: ACTIVE | Noted: 2018-09-06

## 2022-03-20 PROBLEM — G35 MS (MULTIPLE SCLEROSIS) (HCC): Status: ACTIVE | Noted: 2017-08-14

## 2023-07-19 ENCOUNTER — TELEPHONE (OUTPATIENT)
Age: 33
End: 2023-07-19

## 2023-07-19 NOTE — TELEPHONE ENCOUNTER
Caller states:    Would like to have a certificate of immunization completed for school. Alvaro regarding chicken pox and meningococcal vaccines        Date of last appointment:    Visit date not found ---last seen around 2017 or 2018 (or later)  Next appointment:  On schedule for routine cpe on 9/11      Please call to advise if this can be done                    Caller confirms readback of documented phone/fax number(s) as correct. Caller advised that there can be a 24-48 business hour turn around time on callbacks. Caller advised that if pt deems they cannot wait any longer or symptoms worsen, ED or UC would be another option to consider for immediate treatment.

## 2023-07-20 NOTE — TELEPHONE ENCOUNTER
Spoke with pt and rescheduled cpe to a NP [facility] reestablish care appt on 10/13 @ 1030am.  Patient asked if its possible for us to send her a copy of her immunization records?  She needs proof of her varicella & meningococcal vaccines